# Patient Record
Sex: FEMALE | Race: AMERICAN INDIAN OR ALASKA NATIVE
[De-identification: names, ages, dates, MRNs, and addresses within clinical notes are randomized per-mention and may not be internally consistent; named-entity substitution may affect disease eponyms.]

---

## 2017-12-22 NOTE — XRAY REPORT
FINAL REPORT



PROCEDURE:  XR CHEST 1V AP



TECHNIQUE:  Chest radiograph anteroposterior view. CPT 45927







HISTORY:  Dyspnea. 



COMPARISON:  No prior studies are available for comparison.



FINDINGS:  

Heart: Heart size top-normal. 



Mediastinum/Vessels: Normal.



Lungs/Pleural space: Mild perihilar indistinctness. Left greater

than right bibasilar opacities. Blunting of the left costophrenic

angle.



Bony thorax: No acute osseous abnormality.



Life support devices: Central venous catheter tip at the

cavoatrial junction.



IMPRESSION:  

Heart size top-normal.



Mild perihilar indistinctness with left greater than right

bibasilar opacities and small left pleural effusion. Consider

mild congestive heart failure with atelectasis, cannot exclude

pneumonia.

## 2017-12-22 NOTE — EMERGENCY DEPARTMENT REPORT
ED General Adult HPI





- General


Chief complaint: Dyspnea/Respdistress


Stated complaint: DIFFICULTY BREATHING


Time Seen by Provider: 17 15:53


Source: patient, EMS (ems notes not available at time of  chart dictation), RN 

notes reviewed, old records reviewed


Mode of arrival: Stretcher


Limitations: No Limitations





- History of Present Illness


Initial comments: 





This is a 67-year-old female.  The patient is previously known to this 

provider.  Has a past medical history of COPD and chronic asthma.  Patient has 

an ejection fraction of 40-45%.  Patient presents to the ER with a complaint of 

cough, wheezing, mucus production, shortness of breath.  Patient reports that 

her cough has been worsening over the past few days, and she reports that she 

is bringing up "strings of yellow mucus."  Chronic orthopnea, which is not new, 

worsening or different.








Patient denies leg pain, denies leg swelling, denies dietary indiscretions.  

Patient also endorses epigastric abdominal pain which radiates down to her 

lower abdominal region.  She denies urinary symptoms.  She denies constipation.








-: Gradual, days(s)


Location: abdomen


Radiation: abdomen


Severity scale (0 -10): 0


Quality: aching


Consistency: intermittent


Improves with: rest


Worsens with: movement


Associated Symptoms: cough, shortness of breath, weakness, other (chest wall 

pain with coughing)





- Related Data


 Home Medications











 Medication  Instructions  Recorded  Confirmed  Last Taken


 


ALBUTEROL Inhaler [ProAir HFA 2 puff IH QID PRN 10/23/13 10/16/17 12/17/13 07:00





Inhaler]    


 


Albuterol Sulfate [Ventolin HFA] 2 puff IH Q4H PRN 10/16/17 10/16/17 Unknown


 


Carvedilol [Coreg] 25 mg PO BID 10/16/17 10/16/17 Unknown


 


Ergocalciferol [Vitamin D2] 1 cap PO QWEEK 10/16/17 10/16/17 Unknown


 


Potassium Chloride 10 meq PO BID 10/16/17 10/16/17 Unknown








 Previous Rx's











 Medication  Instructions  Recorded  Last Taken  Type


 


Aspirin EC [Aspirin Enteric Coated 81 mg PO QDAY #30 tablet. 16 Unknown 

Rx





TAB]    


 


Nystatin Cream [Mycostatin Cream] 1 applic TP TID 7 Days  tube 10/15/17 Unknown 

Rx


 


Furosemide [Lasix] 20 mg PO BID #30 tablet 10/19/17 Unknown Rx


 


Lisinopril [Zestril TAB] 2.5 mg PO QDAY #30 tablet 10/19/17 Unknown Rx


 


Prednisone [predniSONE 10 mg 10 mg PO .TAPER #1 tab.ds.pk 10/19/17 Unknown Rx





(6-Day Pack, 21 Tabs)]    


 


QUEtiapine [SEROquel] 25 mg PO Q6H PRN #30 tablet 10/19/17 Unknown Rx


 


glipiZIDE [Glucotrol Xl] 2.5 mg PO DAILY #30 tab.er.24 10/19/17 Unknown Rx


 


oxyCODONE /ACETAMINOPHEN [Percocet 1 tab PO Q6H PRN #14 tablet 10/19/17 Unknown 

Rx





5/325 mg]    


 


risperiDONE [RisperDAL] 1 mg PO DAILY #30 tablet 10/19/17 Unknown Rx


 


traMADol [Ultram 50 MG tab] 50 mg PO QPM #14 tablet 10/19/17 Unknown Rx


 


Albuterol Sulfate [Proair 90 mcg IH Q4HR PRN #2 aer.pow.ba 17 Unknown Rx





Respiclick]    


 


Famotidine [Pepcid] 20 mg PO QDAY #30 tablet 17 Unknown Rx


 


Ipratropium Bromide [Atrovent Hfa] 12.9 gm IH Q4HR #2 hfa.aer.ad 17 

Unknown Rx


 


predniSONE [Deltasone] 40 mg PO QDAY #8 tab 17 Unknown Rx











 Allergies











Allergy/AdvReac Type Severity Reaction Status Date / Time


 


ibuprofen [From Motrin] Allergy  Swelling Verified 13 13:35


 


Iodinated Contrast- Oral and Allergy  Rash Verified 14 05:05





IV Dye     


 


ketorolac tromethamine Allergy  Swelling Verified 13 13:35





[From Toradol]     


 


metronidazole [From Flagyl] Allergy  Anaphylaxis Verified 13 13:35


 


Metronidazole HCl Allergy  Anaphylaxis Verified 13 13:35





[From Flagyl]     


 


promethazine HCl Allergy  Dizziness Verified 13 13:35





[From Phenergan]     














ED Review of Systems


ROS: 


Stated complaint: DIFFICULTY BREATHING


Other details as noted in HPI





Constitutional: malaise.  denies: fever


Eyes: denies: vision change


Respiratory: shortness of breath, wheezing


Cardiovascular: denies: syncope


Gastrointestinal: abdominal pain


Genitourinary: denies: dysuria


Musculoskeletal: as per HPI


Skin: as per HPI


Neurological: as per HPI, weakness


Psychiatric: as per HPI





ED Past Medical Hx





- Past Medical History


Hx Hypertension: Yes


Hx Heart Attack/AMI: No


Hx Congestive Heart Failure: Yes


Hx Diabetes: Yes


Hx Deep Vein Thrombosis: No


Hx Asthma: Yes


Hx COPD: Yes


Hx HIV: No


Additional medical history: Gout.  diverticulitous, C. difficile





- Surgical History


Hx Coronary Stent: No


Hx Pacemaker: No


Hx Internal Defibrillator: No


Hx Cholecystectomy: Yes


Hx Appendectomy: Yes


Additional Surgical History: Breast reduction, partial hysterectomy, 

tonsillectomy, colon resection secondary to diverticulitis





- Social History


Smoking Status: Never Smoker


Substance Use Type: None





- Medications


Home Medications: 


 Home Medications











 Medication  Instructions  Recorded  Confirmed  Last Taken  Type


 


ALBUTEROL Inhaler [ProAir HFA 2 puff IH QID PRN 10/23/13 10/16/17 12/17/13 07:

00 History





Inhaler]     


 


Aspirin EC [Aspirin Enteric Coated 81 mg PO QDAY #30 tablet. 08/01/16 10/16/

17 Unknown Rx





TAB]     


 


Nystatin Cream [Mycostatin Cream] 1 applic TP TID 7 Days  tube 10/15/17  

Unknown Rx


 


Albuterol Sulfate [Ventolin HFA] 2 puff IH Q4H PRN 10/16/17 10/16/17 Unknown 

History


 


Carvedilol [Coreg] 25 mg PO BID 10/16/17 10/16/17 Unknown History


 


Ergocalciferol [Vitamin D2] 1 cap PO QWEEK 10/16/17 10/16/17 Unknown History


 


Potassium Chloride 10 meq PO BID 10/16/17 10/16/17 Unknown History


 


Furosemide [Lasix] 20 mg PO BID #30 tablet 10/19/17  Unknown Rx


 


Lisinopril [Zestril TAB] 2.5 mg PO QDAY #30 tablet 10/19/17  Unknown Rx


 


Prednisone [predniSONE 10 mg 10 mg PO .TAPER #1 tab.ds.pk 10/19/17  Unknown Rx





(6-Day Pack, 21 Tabs)]     


 


QUEtiapine [SEROquel] 25 mg PO Q6H PRN #30 tablet 10/19/17  Unknown Rx


 


glipiZIDE [Glucotrol Xl] 2.5 mg PO DAILY #30 tab.er.24 10/19/17  Unknown Rx


 


oxyCODONE /ACETAMINOPHEN [Percocet 1 tab PO Q6H PRN #14 tablet 10/19/17  

Unknown Rx





5/325 mg]     


 


risperiDONE [RisperDAL] 1 mg PO DAILY #30 tablet 10/19/17  Unknown Rx


 


traMADol [Ultram 50 MG tab] 50 mg PO QPM #14 tablet 10/19/17  Unknown Rx


 


Albuterol Sulfate [Proair 90 mcg IH Q4HR PRN #2 aer.pow.ba 17  Unknown Rx





Respiclick]     


 


Famotidine [Pepcid] 20 mg PO QDAY #30 tablet 17  Unknown Rx


 


Ipratropium Bromide [Atrovent Hfa] 12.9 gm IH Q4HR #2 hfa.aer.ad 17  

Unknown Rx


 


predniSONE [Deltasone] 40 mg PO QDAY #8 tab 17  Unknown Rx














ED Physical Exam





- General


Limitations: No Limitations


General appearance: alert, in no apparent distress, obese





- Head


Head exam: Present: atraumatic, normocephalic





- Eye


Eye exam: Present: normal appearance, EOMI.  Absent: nystagmus





- ENT


ENT exam: Present: normal exam, normal orophraynx, mucous membranes moist, 

normal external ear exam





- Neck


Neck exam: Present: normal inspection, full ROM





- Respiratory


Respiratory exam: Present: respiratory distress, wheezes, rhonchi





- Cardiovascular


Cardiovascular Exam: Present: regular rate, normal rhythm, normal heart sounds.

  Absent: systolic murmur, diastolic murmur, rubs, gallop





- GI/Abdominal


GI/Abdominal exam: Present: soft, normal bowel sounds.  Absent: distended, 

tenderness, guarding, rebound, rigid





- Extremities Exam


Extremities exam: Present: normal inspection, normal capillary refill.  Absent: 

calf tenderness





- Back Exam


Back exam: Present: normal inspection, full ROM.  Absent: tenderness, CVA 

tenderness (R), paraspinal tenderness, vertebral tenderness





- Neurological Exam


Neurological exam: Present: alert, oriented X3, CN II-XII intact, other (

Extraocular movements intact.  Tongue midline.  No facial droop.  Facial 

sensation intact to light touch in the V1, V2, V3 distribution bilaterally.  5 

and 5 strength in 4 extremities..  Sensation is intact to light touch in 4 

extremities.).  Absent: motor sensory deficit





- Psychiatric


Psychiatric exam: Present: normal affect, normal mood





- Skin


Skin exam: Present: warm, dry, intact, normal color.  Absent: rash





ED Course


 Vital Signs











  17





  14:46 16:56 17:19


 


Temperature   97.6 F


 


Pulse Rate 63  


 


Respiratory 21 20 





Rate   


 


Blood Pressure 125/86  





[Left]   


 


O2 Sat by Pulse 95  





Oximetry   














  17





  18:03


 


Temperature 


 


Pulse Rate 


 


Respiratory 





Rate 


 


Blood Pressure 





[Left] 


 


O2 Sat by Pulse 96





Oximetry 














- Reevaluation(s)


Reevaluation #1: 





17 17:14


Differential diagnosis, including but not limited to: COPD exacerbation, 

congestive heart failure, cardiorenal syndrome, intra-abdominal infection, GERD

, gastritis, acute coronary syndrome, urinary tract infection














Pulmonary hypertension











Assessment and plan:











67-year-old female with multiple medical comorbidities, including COPD, 

pulmonary hypertension, decreased left ventricular systolic function with an EF 

of 40-45%, with cough, wheezing, mucus production.  Saturating 90-91% on room 

air.  Also complaining of chronic abdominal pain, patient has been seen and 

evaluated for this in the past.  Renal function worse than prior, may be a 

component of cardiorenal syndrome.  Patient's having wheezing as well.





Patient will be given albuterol, Atrovent, steroids, Lasix therapy.





Doubt intra-abdominal process given documented history of chronic abdominal pain

, but will obtain a noncontrast CT scan of the abdomen and pelvis.  Urinalysis 

is pending as well.  EKG is pending as well.  Oral temperature is pending as 

well.








17 17:23


Patient afebrile, EKG is unchanged from prior.  Urinalysis is negative.


17 19:29





Reevaluation #2: 





17 17:42


Patient refused arterial blood gas


Reevaluation #3: 





17 19:24


Vital signs have remained stable, patient has not dramatically desaturated, nor 

has she vomited.  X-ray of the chest suggested CHF or pneumonia, however 

noncontrast CT scan of the abdomen and pelvis demonstrated no evidence of 

congestive heart failure or pneumonia and the patient's lower lung fields.  No 

murmurs incidental findings were noted.





Patient has no epigastric tenderness or vomiting, therefore think pancreatitis 

is unlikely, she can follow up with outpatient primary care doctor or 

gastroenterologist for incidental GI findings.  Has a surgical history of 

cholecystectomy.





EKG abnormal but unchanged from prior, troponin negative 2.  Patient most 

likely experiencing the normal expected history of her pulmonary hypertension 

and COPD.  She will be discharged with cough medication, breathing medication, 

she can follow up with outpatient primary care, cardiology/GI, and pulmonology.











patient has had LAURA in the past, with a creatinine as high as 2.3; she can 

follow up for her mild LAURA as an outpatient


17 19:43





Reevaluation #4: 





17 19:54


troponins negative x 2.





patient speaking on a cell phone in no distress in complete sentences.





ED Medical Decision Making





- Lab Data


Result diagrams: 


 17 16:26





 17 16:26








 Vital Signs











  17





  14:46 16:56 17:19


 


Temperature   97.6 F


 


Pulse Rate 63  


 


Respiratory 21 20 





Rate   


 


Blood Pressure 125/86  





[Left]   


 


O2 Sat by Pulse 95  





Oximetry   














 Lab Results











  17 Range/Units





  16:26 16:26 16:26 


 


WBC   6.0   (4.5-11.0)  K/mm3


 


RBC   4.68   (3.65-5.03)  M/mm3


 


Hgb   14.8 H   (10.1-14.3)  gm/dl


 


Hct   45.4 H   (30.3-42.9)  %


 


MCV   97   (79-97)  fl


 


MCH   32   (28-32)  pg


 


MCHC   33   (30-34)  %


 


RDW   14.0   (13.2-15.2)  %


 


Plt Count   108 L   (140-440)  K/mm3


 


Mono % (Auto)   Np   


 


PT    13.8  (12.2-14.9)  Sec.


 


INR    1.01  (0.87-1.13)  


 


APTT    27.3  (24.2-36.6)  Sec.


 


Sodium  144    (137-145)  mmol/L


 


Potassium  4.0    (3.6-5.0)  mmol/L


 


Chloride  97.6 L    ()  mmol/L


 


Carbon Dioxide  30    (22-30)  mmol/L


 


Anion Gap  20    mmol/L


 


BUN  28 H    (7-17)  mg/dL


 


Creatinine  1.6 H    (0.7-1.2)  mg/dL


 


Estimated GFR  39    ml/min


 


BUN/Creatinine Ratio  18    %


 


Glucose  86    ()  mg/dL


 


Calcium  8.8    (8.4-10.2)  mg/dL


 


Troponin T     (0.00-0.029)  ng/mL














  17 Range/Units





  16:26 


 


WBC   (4.5-11.0)  K/mm3


 


RBC   (3.65-5.03)  M/mm3


 


Hgb   (10.1-14.3)  gm/dl


 


Hct   (30.3-42.9)  %


 


MCV   (79-97)  fl


 


MCH   (28-32)  pg


 


MCHC   (30-34)  %


 


RDW   (13.2-15.2)  %


 


Plt Count   (140-440)  K/mm3


 


Mono % (Auto)   


 


PT   (12.2-14.9)  Sec.


 


INR   (0.87-1.13)  


 


APTT   (24.2-36.6)  Sec.


 


Sodium   (137-145)  mmol/L


 


Potassium   (3.6-5.0)  mmol/L


 


Chloride   ()  mmol/L


 


Carbon Dioxide   (22-30)  mmol/L


 


Anion Gap   mmol/L


 


BUN   (7-17)  mg/dL


 


Creatinine   (0.7-1.2)  mg/dL


 


Estimated GFR   ml/min


 


BUN/Creatinine Ratio   %


 


Glucose   ()  mg/dL


 


Calcium   (8.4-10.2)  mg/dL


 


Troponin T  < 0.010  (0.00-0.029)  ng/mL














- EKG Data


EKG shows normal: sinus rhythm





- EKG Data





17 19:09


Sinus, 66 bpm, left axis, QTC prolonged, abnormal EKG, not morphologically 

consistent with ST elevation myocardial infarction, motion artifact, appears 

unchanged from prior EKG from 10/16/2017





- Radiology Data


Radiology results: report reviewed, image reviewed


interpreted by me: 





X-rays a chest colon, interpreted by this provider.  Cardiomegaly, right-sided 

port noted, mild pulmonary vascular congestion.





Referring Physician:   AIDE HENRIQUEZ


Patient Name:   CAMILO VIDES


Patient ID:   [id number]


YOB: 1950


Sex:   Female


Accession:   [id number]


Report Date:   2017


Report Status:   Finalized


Findings


[facility] 


[address] [place] [address] 





Cat Scan Report 


Signed 





Patient: CAMILO VIDES MR#: [id number] 


: 1950 Acct:[id number] 


Age/Sex: 67 / F ADM Date: 17 


Loc: ED 


Attending Dr: 








Ordering Physician: AIDE HENRIQUEZ MD 


Date of Service: 17 


Procedure(s): CT abdomen pelvis wo con 


Accession Number(s): [id number] 





cc: AIDE HENRIQUEZ MD 








FINAL REPORT 





PROCEDURE: CT ABDOMEN PELVIS WO CON 





TECHNIQUE: Computerized axial tomography of the abdomen and 


pelvis was performed without intravenous contrast. This study is 


performed without intravascular contrast material and its 


sensitivity for abdominal and pelvic pathology, including 


neoplasms, inflammation, abscess, free fluid, thrombosis, 


arterial dissection and infarction, is reduced compared with a 


contrast enhanced study. DLP 2015.13 mGy-cm. 





HISTORY: Abdominal pain. 





COMPARISON: No prior studies are available for comparison. 





FINDINGS: 


Visualized lower thorax: Inferior aspect of central venous 


catheter in the distal SVC/cavoatrial junction. Mild 


cardiomegaly. Coronary artery disease. Mild elevation of the 


right diaphragm. Possible ill-defined 2-3 millimeter nodules in 


the right upper lobe adjacent to the fissure, in the periphery of 


the lingula, and in the periphery of the bilateral lower lobes. 





Liver: Normal size and attenuation. 





Spleen: Normal size and attenuation. 





Gallbladder and biliary system: Gallbladder not seen, consider 


correlating clinically with surgical history of cholecystectomy. 





Pancreas: Possible subtle low-attenuation and enlargement about 


the pancreatic head. 





Adrenals: Normal. 





Kidneys: Normal. 





GI tract: Moderate fatty infiltration of the cecum and ascending 


colon. A normal appendix is not confidently identified. 


Diverticulosis. 4.2 cm air-filled structure about the gastric 


antrum. 





Lymph nodes and mesentery: Small periaortic and mesenteric lymph 


nodes. 





Vasculature: Mild atherosclerosis. 





Bladder: Normal. 





Reproductive organs: Hysterectomy. 





Peritoneum: No free fluid. 





Musculoskeletal structures: Osteopenia. Small multilevel 


osteophytes. Moderate L2 superior endplate compression, age 


indeterminate.. 





Other: Subcutaneous/flank calcifications likely injection 


granulomas. Anterior abdominal wall diastasis. 





IMPRESSION: 


Mild cardiomegaly. Mild coronary artery disease. Pulmonary 


nodules, indeterminate. Consider correlation with dedicated 


thoracic imaging. 





Possible subtle low-attenuation and enlargement about the 


pancreatic head, consider examination with iv contrast or MRI for 


further evaluation if there is continued clinical concern for 


pancreatic pathology. Also consider correlating with lipase 


values. 





Moderate fatty infiltration of the cecum and ascending colon, can 


be seen with obesity, steroid usage, also consider colitis. 





Diverticulosis. 





4.2 centimeter air-filled structure about the gastric antrum, 


consider gastric diverticulum. Limited evaluation on non 


contrasted evaluation, less likely consider localized air 


containing structure. Consider examination with oral contrast if 


there is continued clinical concern. 





Normal appendix not confidently seen. No secondary signs of acute 


appendicitis. Consider correlation with surgical history and 


further evaluation and followup including examination with oral 


contrast if this is of continued clinical concern. 











Transcribed By: MAX 


Dictated By: ADILIA TABARES MD 


Electronically Authenticated By: ADILIA TABARES MD 


Signed Date/Time: 17 7822 


Critical care attestation.: 


If time is entered above; I have spent that time in minutes in the direct care 

of this critically ill patient, excluding procedure time.








ED Disposition


Clinical Impression: 


 Bronchitis





Disposition: DC-01 TO HOME OR SELFCARE


Is pt being admited?: No


Does the pt Need Aspirin: No


Condition: Stable


Instructions:  Chronic Bronchitis (ED)


Additional Instructions: 


Symptoms most likely coming from acute on chronic chronic bronchitis, which is 

likely secondary to the patient's underlying history of COPD and pulmonary 

hypertension.  Continue current outpatient breathing medications outpatient 

prescribed medications, take albuterol, Atrovent every 4-6 hours for the next 5 

days, and take the steroids as directed as well.  Avoid consumption of heavy, 

spicy foods, and follow up with either an outpatient primary care doctor or 

pulmonary doctor for the cough, breathing and mucus production within the next 7

-10 days.





Dr. Mcfarland is a local primary care doctor.





Dr. Hernandez is a local pulmonary specialist.








CT scan of the abdomen and pelvis also demonstrated nonspecific lesions on the 

lungs, this should be followed up by her primary care doctor or pulmonary 

doctor as recommended, not following up as recommended may result in 

undiagnosed tumor, cancer, malignancy.





CT scan of the abdomen and pelvis demonstrated calcifications in the coronary 

arteries, therefore the patient should follow-up with a cardiologist within the 

next week.





Dr. Good is a local cardiology specialist.





CT scan of the abdomen and pelvis also demonstrated nonspecific pancreatic 

abnormalities, as well as abnormalities in the stomach.  Patient should follow 

up with outpatient primary care doctor or gastroenterology for this within the 

next month.  Dr. Chatterjee is a local gastroenterology specialist.  Not following 

up for incidental abnormalities noted on CAT scan in the GI system may result 

in undiagnosed tumor, cancer, malignancy.  Therefore it is very important to 

follow-up as recommended.





Return to the ER right away with new pain, worsened pain, migration of pain, 

fevers, chills, lethargy, irritability, projectile vomiting, change in mental 

status, confusion, inability to tolerate liquid feeds.


Prescriptions: 


Albuterol Sulfate [Proair Respiclick] 90 mcg IH Q4HR PRN #2 aer.pow.ba


 PRN Reason: Wheezing


Famotidine [Pepcid] 20 mg PO QDAY #30 tablet


Ipratropium Bromide [Atrovent Hfa] 12.9 gm IH Q4HR #2 hfa.aer.ad


predniSONE [Deltasone] 40 mg PO QDAY #8 tab


Referrals: 


PRIMARY CARE,MD [Primary Care Provider] - 3-5 Days


VINAY GOOD MD [Staff Physician] - 3-5 Days


TASNEEM GANDHI MD [Staff Physician] - 3-5 Days


HI CHATTERJEE MD [Staff Physician] - 3-5 Days


AVIVA MCFARLAND MD [Staff Physician] - 3-5 Days

## 2018-05-06 ENCOUNTER — HOSPITAL ENCOUNTER (EMERGENCY)
Dept: HOSPITAL 5 - ED | Age: 68
End: 2018-05-06
Payer: MEDICARE

## 2018-05-06 DIAGNOSIS — J44.9: ICD-10-CM

## 2018-05-06 DIAGNOSIS — Z91.041: ICD-10-CM

## 2018-05-06 DIAGNOSIS — Z88.6: ICD-10-CM

## 2018-05-06 DIAGNOSIS — M10.9: ICD-10-CM

## 2018-05-06 DIAGNOSIS — Z88.8: ICD-10-CM

## 2018-05-06 DIAGNOSIS — Z90.49: ICD-10-CM

## 2018-05-06 DIAGNOSIS — Z90.89: ICD-10-CM

## 2018-05-06 DIAGNOSIS — I11.0: ICD-10-CM

## 2018-05-06 DIAGNOSIS — I50.9: ICD-10-CM

## 2018-05-06 DIAGNOSIS — Z90.710: ICD-10-CM

## 2018-05-06 DIAGNOSIS — E11.9: ICD-10-CM

## 2018-05-06 DIAGNOSIS — I46.9: Primary | ICD-10-CM

## 2018-05-06 PROCEDURE — 92950 HEART/LUNG RESUSCITATION CPR: CPT

## 2018-05-06 PROCEDURE — 99285 EMERGENCY DEPT VISIT HI MDM: CPT

## 2018-05-06 NOTE — EMERGENCY DEPARTMENT REPORT
ED CPR HPI





- General


Stated Complaint: CARDIAC ARREST


Time Seen by Provider: 18 08:15


Source: EMS


Mode of arrival: Stretcher


Limitations: Other





- History of Present Illness


Initial Comments: 





According to EMS report, patient was found to unresponsive and asystolic at 

home.  Patient is on hospice.  She was diagnosed with bowel obstruction in the 

past and she refuses surgery.  The paramedics started CPR and intubated patient 

on the field.  Associated pain was also given prior to arrival to the ED.  When 

the patient arrived in the ED, she was still asystolic and unresponsive.  CPR 

was continued in the ED and patient was given another round of epinephrine and 

1 amp of bicarbonate.  She remained asystolic and her pupils were fixed and 

dilated.  CPR was continued for a total of additional 9 minutes without 

success.  Patient was pronounced dead at 7:59 AM.  I spoke with her  who 

came to the hospital and informed him about patients death.  Patient  

said he had workup this morning and noticed that the patient was unconscious 

and gasping for air.  He then called 911. He also said he was not surprised 

about the death since the patient had refused surgery for bowel obstruction and 

wanted to to be allowed to die if it comes to that. She does not want to suffer.


MD Complaint: found unresponsive


-: unknown


Bystander CPR Performed: Yes (EMS)


Initial Findings in the Field: unresponsive, no pulse


ROSC in the Field: No


Associated Injuries: No


Treatments Prior to Arrival: intubation, BMV, chest compressions, 

defribrillated shocks #, epinephrine mgs #





- Related Data


 Home Medications











 Medication  Instructions  Recorded  Confirmed  Last Taken


 


ALBUTEROL Inhaler [ProAir HFA 2 puff IH QID PRN 10/23/13 03/29/18 1 Day Ago





Inhaler]    ~18


 


Albuterol Sulfate [Ventolin HFA] 2 puff IH Q4H PRN 10/16/17 03/29/18 1 Day Ago





    ~18


 


Ergocalciferol [Vitamin D2] 1 cap PO QWEEK 10/16/17 03/29/18 1 Day Ago





    ~18


 


Potassium Chloride 10 meq PO BID 10/16/17 03/29/18 1 Day Ago





    ~18








 Previous Rx's











 Medication  Instructions  Recorded  Last Taken  Type


 


Aspirin EC [Aspirin Enteric Coated 81 mg PO QDAY #30 tablet. 16 1 Day 

Ago Rx





TAB]   ~18 


 


Nystatin Cream [Mycostatin Cream] 1 applic TP TID 7 Days  tube 10/15/17 Unknown 

Rx


 


Furosemide [Lasix] 20 mg PO BID #30 tablet 10/19/17 1 Day Ago Rx





   ~18 


 


QUEtiapine [SEROquel] 25 mg PO Q6H PRN #30 tablet 10/19/17 1 Day Ago Rx





   ~18 


 


glipiZIDE [Glucotrol Xl] 2.5 mg PO DAILY #30 tab.er.24 10/19/17 1 Day Ago Rx





   ~18 


 


risperiDONE [RisperDAL] 1 mg PO DAILY #30 tablet 10/19/17 1 Day Ago Rx





   ~18 


 


Albuterol Sulfate [Proair 90 mcg IH Q4HR PRN #2 aer.pow.ba 17 1 Day Ago Rx





Respiclick]   ~18 


 


Ipratropium Bromide [Atrovent Hfa] 12.9 gm IH Q4HR #2 hfa.aer.ad 17 1 Day 

Ago Rx





   ~18 


 


Ipratropium/Albuterol Sulfate 1 ampul IH TIDRT #30 ampul.neb 18 Unknown Rx





[DUONEB *Not for PRN Use*]    


 


Levofloxacin [Levaquin TAB] 750 mg PO Q24H #30 tablet 18 Unknown Rx


 


Metoprolol Xl [Metoprolol 50 mg PO QDAY #30 tablet 18 Unknown Rx





SUCCINATE ER TAB]    


 


Pantoprazole [Protonix TAB] 40 mg PO QDAY #30 tablet 18 Unknown Rx











 Allergies











Allergy/AdvReac Type Severity Reaction Status Date / Time


 


ibuprofen [From Motrin] Allergy  Swelling Verified 13 13:35


 


Iodinated Contrast- Oral and Allergy  Rash Verified 14 05:05





IV Dye     


 


ketorolac tromethamine Allergy  Swelling Verified 13 13:35





[From Toradol]     


 


metronidazole [From Flagyl] Allergy  Anaphylaxis Verified 13 13:35


 


Metronidazole HCl Allergy  Anaphylaxis Verified 13 13:35





[From Flagyl]     


 


promethazine HCl Allergy  Dizziness Verified 13 13:35





[From Phenergan]     














ED Review of Systems


ROS: 


Stated complaint: CARDIAC ARREST


Other details as noted in HPI





Comment: Unobtainable due to pts medical conditions





ED Past Medical Hx





- Past Medical History


Hx Hypertension: Yes


Hx Heart Attack/AMI: No


Hx Congestive Heart Failure: Yes


Hx Diabetes: Yes


Hx Deep Vein Thrombosis: No


Hx Asthma: Yes


Hx COPD: Yes


Hx HIV: No


Additional medical history: Gout.  diverticulitous, C. difficile





- Surgical History


Hx Coronary Stent: No


Hx Pacemaker: No


Hx Internal Defibrillator: No


Hx Cholecystectomy: Yes


Hx Appendectomy: Yes


Hx Breast Surgery: Yes (breast reduction)


Additional Surgical History: Breast reduction, partial hysterectomy, 

tonsillectomy, colon resection secondary to diverticulitis





- Social History


Smoking Status: Unknown if ever smoked





- Medications


Home Medications: 


 Home Medications











 Medication  Instructions  Recorded  Confirmed  Last Taken  Type


 


ALBUTEROL Inhaler [ProAir HFA 2 puff IH QID PRN 10/23/13 03/29/18 1 Day Ago 

History





Inhaler]    ~18 


 


Aspirin EC [Aspirin Enteric Coated 81 mg PO QDAY #30 tablet.dr 16 1 Day Ago Rx





TAB]    ~18 


 


Nystatin Cream [Mycostatin Cream] 1 applic TP TID 7 Days  tube 10/15/17 03/29/

18 Unknown Rx


 


Albuterol Sulfate [Ventolin HFA] 2 puff IH Q4H PRN 10/16/17 03/29/18 1 Day Ago 

History





    ~18 


 


Ergocalciferol [Vitamin D2] 1 cap PO QWEEK 10/16/17 03/29/18 1 Day Ago History





    ~18 


 


Potassium Chloride 10 meq PO BID 10/16/17 03/29/18 1 Day Ago History





    ~18 


 


Furosemide [Lasix] 20 mg PO BID #30 tablet 10/19/17 03/29/18 1 Day Ago Rx





    ~18 


 


QUEtiapine [SEROquel] 25 mg PO Q6H PRN #30 tablet 10/19/17 03/29/18 1 Day Ago Rx





    ~18 


 


glipiZIDE [Glucotrol Xl] 2.5 mg PO DAILY #30 tab.er.24 10/19/17 03/29/18 1 Day 

Ago Rx





    ~18 


 


risperiDONE [RisperDAL] 1 mg PO DAILY #30 tablet 10/19/17 03/29/18 1 Day Ago Rx





    ~18 


 


Albuterol Sulfate [Proair 90 mcg IH Q4HR PRN #2 aer.pow.ba 17 1 

Day Ago Rx





Respiclick]    ~18 


 


Ipratropium Bromide [Atrovent Hfa] 12.9 gm IH Q4HR #2 hfa.aer.ad 17 1 Day Ago Rx





    ~18 


 


Ipratropium/Albuterol Sulfate 1 ampul IH TIDRT #30 ampul.neb 18  Unknown 

Rx





[DUONEB *Not for PRN Use*]     


 


Levofloxacin [Levaquin TAB] 750 mg PO Q24H #30 tablet 18  Unknown Rx


 


Metoprolol Xl [Metoprolol 50 mg PO QDAY #30 tablet 18  Unknown Rx





SUCCINATE ER TAB]     


 


Pantoprazole [Protonix TAB] 40 mg PO QDAY #30 tablet 18  Unknown Rx














ED Physical Exam





- General


Limitations: Other (Unresponsive with CPR in progress.)





- Head


Head exam: Present: atraumatic, normocephalic





- Eye


Eye exam: Present: other (Fixed and dilated)





- Respiratory


Respiratory exam: Present: other (Breath sounds bilaterally with bagging.)





- Cardiovascular


Cardiovascular Exam: Present: other (asystolic)





- GI/Abdominal


GI/Abdominal exam: Present: soft





- Extremities Exam


Extremities exam: Present: normal inspection, other (IO in left tibia inserted 

by EMS.)





- Skin


Skin exam: Present: dry, intact


Critical care attestation.: 


If time is entered above; I have spent that time in minutes in the direct care 

of this critically ill patient, excluding procedure time.








ED Disposition


Clinical Impression: 


 Cardiac arrest





Disposition: [id number] 


Is pt being admited?: No


Does the pt Need Aspirin: No


Condition: Critical